# Patient Record
Sex: MALE | Race: WHITE
[De-identification: names, ages, dates, MRNs, and addresses within clinical notes are randomized per-mention and may not be internally consistent; named-entity substitution may affect disease eponyms.]

---

## 2020-06-27 ENCOUNTER — HOSPITAL ENCOUNTER (EMERGENCY)
Dept: HOSPITAL 41 - JD.ED | Age: 25
LOS: 1 days | Discharge: HOME | End: 2020-06-28
Payer: SELF-PAY

## 2020-06-27 DIAGNOSIS — F17.210: ICD-10-CM

## 2020-06-27 DIAGNOSIS — F23: ICD-10-CM

## 2020-06-27 DIAGNOSIS — T51.91XA: Primary | ICD-10-CM

## 2020-06-28 NOTE — [email or web address].[email or web address]
ED HPI GENERAL MEDICAL PROBLEM





- General


Chief Complaint: Drug or Alcohol Abuse


Stated Complaint: MED CLEARANCE


Time Seen by Provider: 06/28/20 00:21


Source of Information: Reports: Patient, Police


History Limitations: Reports: Other (Is awake and talking but he is obviously 

under the influence of alcohol and possibly drugs)





- History of Present Illness


INITIAL COMMENTS - FREE TEXT/NARRATIVE: 





Is a 24-year-old male.  He comes to the ER by the police department.  The 

patient states that he called himself and because he wanted to beat up his 

family with a hammer.  I have no way of substantiate any of this is true or not.

 However he wanted to argue with me regarding definitions of medical terms he 

carried on a conversation with no difficulty.  He walked in here on his own 

accord with no difficulty and no weaving.  Do not feel at this time that is it 

is necessary to get an alcohol or drug screen since he is going to retirement anyway 

and he is able to walk with no difficulty at answer questions with no 

difficulty.  Does not mean however that he is not wanting to be difficult.


  ** Bilateral Wrist


Pain Score (Numeric/FACES): 2





- Related Data


                                    Allergies











Allergy/AdvReac Type Severity Reaction Status Date / Time


 


No Known Allergies Allergy   Verified 06/28/20 00:14











Home Meds: 


                                    Home Meds





. [No Known Home Meds]  06/28/20 [History]











Past Medical History





- Past Health History


Medical/Surgical History: Denies Medical/Surgical History


Psychiatric History: Reports: Addiction





Social & Family History





- Family History


Family Medical History: Noncontributory





- Tobacco Use


Smoking Status *Q: Current Every Day Smoker


Years of Tobacco use: 4


Packs/Tins Daily: 0.1





- Caffeine Use


Caffeine Use: Reports: Coffee, Soda





- Alcohol Use


Date of Last Drink: 06/27/20


Time of Last Drink: 21:30





- Recreational Drug Use


Recreational Drug Use: Yes


Drug Use in Last 12 Months: No


Recreational Drug Type: Reports: Marijuana/Hashish





ED ROS GENERAL





- Review of Systems


Review Of Systems: See Below


Reason Not Obtained: He not wanting to answer questions so ROS not obtained





ED EXAM, BEHAVIORAL HEALTH





- Physical Exam


Exam: See Below


Exam Limited By: Other (To the influence of alcohol and possibly drugs)


General Appearance: Alert, WD/WN, No Apparent Distress


Eye Exam: Bilateral Eye: Normal Inspection (Does appear to be somewhat bug eyed 

when he stares at me)


Ears: Normal External Exam


Nose: Normal Inspection


Throat/Mouth: No Airway Compromise


Head: Normocephalic


Neck: Supple


Respiratory/Chest: No Respiratory Distress, Lungs Clear, Normal Breath Sounds


Cardiovascular: Regular Rate, Rhythm, No Murmur


Back Exam: Normal Inspection, Full Range of Motion


Extremities: Normal Inspection, Normal Range of Motion


Neurological: Alert, Other (Does not appear to have any neurological deficits in

 his upper and lower extremities and he walks with no difficulty)


Psychiatric: Alert, Restless, Flight of Ideas, Homicidal Thoughts, Tangential 

Thoughts


Skin Exam: Warm, Dry





COURSE, BEHAVIORAL HEALTH COMP





- Course


Vital Signs: 





                                Last Vital Signs











Temp  99.4 F   06/28/20 00:09


 


Pulse  146 H  06/28/20 00:09


 


Resp  20   06/28/20 00:09


 


BP  161/97 H  06/28/20 00:09


 


Pulse Ox  95   06/28/20 00:09














Departure





- Departure


Time of Disposition: 00:32


Disposition: Home, Self-Care 01


Condition: Fair


Clinical Impression: 


 Acute psychosis, Alcohol ingestion








- Discharge Information


*PRESCRIPTION DRUG MONITORING PROGRAM REVIEWED*: Not Applicable


*COPY OF PRESCRIPTION DRUG MONITORING REPORT IN PATIENT PILAR: Not Applicable


Instructions:  Finding Treatment for Addiction


Forms:  ED Department Discharge


Additional Instructions: 


I observe the patient in the ER and talked to him.  He walks with no difficulty 

and does not appear to have any weaving in his walking.  He is conversational 

though he does have some psychotic features with flight of ideas and tangential 

thoughts.  I do not anticipate a deterioration of his physical condition and am 

releasing him with the officers to go to retirement.  If there is any change in his 

physical condition bring him back to the ER.





Sepsis Event Note (ED)





- Evaluation


Sepsis Screening Result: No Definite Risk





- Focused Exam


Vital Signs: 





                                   Vital Signs











  Temp Pulse Resp BP Pulse Ox


 


 06/28/20 00:09  99.4 F  146 H  20  161/97 H  95